# Patient Record
Sex: MALE | Race: WHITE | URBAN - METROPOLITAN AREA
[De-identification: names, ages, dates, MRNs, and addresses within clinical notes are randomized per-mention and may not be internally consistent; named-entity substitution may affect disease eponyms.]

---

## 2021-05-20 ENCOUNTER — LAB OUTSIDE AN ENCOUNTER (OUTPATIENT)
Dept: URBAN - METROPOLITAN AREA TELEHEALTH 2 | Facility: TELEHEALTH | Age: 76
End: 2021-05-20

## 2021-05-20 ENCOUNTER — TELEPHONE ENCOUNTER (OUTPATIENT)
Dept: URBAN - METROPOLITAN AREA CLINIC 92 | Facility: CLINIC | Age: 76
End: 2021-05-20

## 2021-05-20 ENCOUNTER — OFFICE VISIT (OUTPATIENT)
Dept: URBAN - METROPOLITAN AREA TELEHEALTH 2 | Facility: TELEHEALTH | Age: 76
End: 2021-05-20
Payer: MEDICARE

## 2021-05-20 ENCOUNTER — TELEPHONE ENCOUNTER (OUTPATIENT)
Dept: URBAN - METROPOLITAN AREA CLINIC 6 | Facility: CLINIC | Age: 76
End: 2021-05-20

## 2021-05-20 VITALS — BODY MASS INDEX: 25.05 KG/M2 | WEIGHT: 175 LBS | HEIGHT: 70 IN

## 2021-05-20 DIAGNOSIS — C61 PROSTATE CANCER: ICD-10-CM

## 2021-05-20 DIAGNOSIS — Z12.11 COLON CANCER SCREENING: ICD-10-CM

## 2021-05-20 DIAGNOSIS — K57.30 ACQUIRED DIVERTICULOSIS OF COLON: ICD-10-CM

## 2021-05-20 DIAGNOSIS — K21.9 GERD: ICD-10-CM

## 2021-05-20 DIAGNOSIS — K31.89 INTESTINAL METAPLASIA OF GASTRIC MUCOSA: ICD-10-CM

## 2021-05-20 DIAGNOSIS — R19.5 MUCUS IN STOOL: ICD-10-CM

## 2021-05-20 DIAGNOSIS — K29.40 ATROPHIC GASTRITIS WITHOUT HEMORRHAGE: ICD-10-CM

## 2021-05-20 DIAGNOSIS — K92.89 GAS BLOAT SYNDROME: ICD-10-CM

## 2021-05-20 DIAGNOSIS — K57.90 DIVERTICULOSIS: ICD-10-CM

## 2021-05-20 PROCEDURE — 99214 OFFICE O/P EST MOD 30 MIN: CPT | Performed by: INTERNAL MEDICINE

## 2021-05-20 RX ORDER — CLONAZEPAM 0.5 MG/1
TABLET ORAL
Qty: 0 | Refills: 0 | Status: ACTIVE | COMMUNITY
Start: 1900-01-01

## 2021-05-20 RX ORDER — GLUCOSAMINE HCL/CHONDROITIN SU 500-400 MG
CAPSULE ORAL
Qty: 0 | Refills: 0 | Status: ACTIVE | COMMUNITY
Start: 1900-01-01

## 2021-05-20 RX ORDER — LEVOTHYROXINE SODIUM 137 UG/1
TABLET ORAL
Qty: 0 | Refills: 0 | Status: ACTIVE | COMMUNITY
Start: 1900-01-01

## 2021-05-20 RX ORDER — FAMOTIDINE 40 MG/1
1 TABLET AT BEDTIME TABLET, FILM COATED ORAL ONCE A DAY
Status: ACTIVE | COMMUNITY

## 2021-05-20 RX ORDER — AMLODIPINE BESYLATE 2.5 MG
1 TABLET TABLET ORAL ONCE A DAY
Status: ACTIVE | COMMUNITY

## 2021-05-20 NOTE — HPI-OTHER HISTORIES
LOT OF THING HAPPEN,  HAD PROSTATE CANCER, PROSTATE SURGERY, REMOVED NOV 2019.   CANCER INSIDE PROSTATE,  NEEDED RADIATION JUNE 2020 FOR SIX WEEKS  AUG 2020, PSA, CONTINUE RISE. BONE SCAN AND CT ABDOMEN WERE NEG  DID PET SCAN IT WAS NEG IN 28 APRIL 2021  STARTED ON HORMONES, 7 TH MAY.   SINCE THEN DURING DAY LEAKS OUT.  EXPENRIENCE BOWEL, EXTRA MOVEMENT, AWFULL CHAKA,   OFF STOOL SMEAR , DISCOLORATION,  WITH PASSING GAS.   NO DIARRRHEA. BM NORMAL DAILY. NO BLOOD IN STOOL.   60 DAYS AGO, GAS AND FEEL LIKE PASSING GAS, WITH LITTLE MUCUS.   LAST WEEK STOPPED FOR 7 DAYS IN ROW, AGAIN STARTED.  DOES TAKE METAMUCIL 1 TABLESPOON FUL DAILY

## 2021-06-30 ENCOUNTER — OFFICE VISIT (OUTPATIENT)
Dept: URBAN - METROPOLITAN AREA SURGERY CENTER 13 | Facility: SURGERY CENTER | Age: 76
End: 2021-06-30
Payer: MEDICARE

## 2021-06-30 ENCOUNTER — CLAIMS CREATED FROM THE CLAIM WINDOW (OUTPATIENT)
Dept: URBAN - METROPOLITAN AREA CLINIC 4 | Facility: CLINIC | Age: 76
End: 2021-06-30
Payer: MEDICARE

## 2021-06-30 DIAGNOSIS — K29.40 ATROPHIC GASTRITIS: ICD-10-CM

## 2021-06-30 DIAGNOSIS — K31.89 GASTRIC FOVEOLAR HYPERPLASIA: ICD-10-CM

## 2021-06-30 DIAGNOSIS — K21.9 ACID REFLUX: ICD-10-CM

## 2021-06-30 DIAGNOSIS — Z12.11 COLON CANCER SCREENING: ICD-10-CM

## 2021-06-30 DIAGNOSIS — K22.8 COLUMNAR-LINED ESOPHAGUS: ICD-10-CM

## 2021-06-30 DIAGNOSIS — K22.8 OTHER SPECIFIED DISEASES OF ESOPHAGUS: ICD-10-CM

## 2021-06-30 DIAGNOSIS — K29.40 CHRONIC ATROPHIC GASTRITIS WITHOUT BLEEDING: ICD-10-CM

## 2021-06-30 PROCEDURE — 88312 SPECIAL STAINS GROUP 1: CPT | Performed by: PATHOLOGY

## 2021-06-30 PROCEDURE — 88342 IMHCHEM/IMCYTCHM 1ST ANTB: CPT | Performed by: PATHOLOGY

## 2021-06-30 PROCEDURE — 88305 TISSUE EXAM BY PATHOLOGIST: CPT | Performed by: PATHOLOGY

## 2021-06-30 PROCEDURE — 43239 EGD BIOPSY SINGLE/MULTIPLE: CPT | Performed by: INTERNAL MEDICINE

## 2021-06-30 PROCEDURE — G0121 COLON CA SCRN NOT HI RSK IND: HCPCS | Performed by: INTERNAL MEDICINE

## 2021-06-30 PROCEDURE — G8907 PT DOC NO EVENTS ON DISCHARG: HCPCS | Performed by: INTERNAL MEDICINE

## 2021-06-30 PROCEDURE — 88341 IMHCHEM/IMCYTCHM EA ADD ANTB: CPT | Performed by: PATHOLOGY

## 2021-07-22 ENCOUNTER — WEB ENCOUNTER (OUTPATIENT)
Dept: URBAN - METROPOLITAN AREA CLINIC 115 | Facility: CLINIC | Age: 76
End: 2021-07-22

## 2022-02-09 ENCOUNTER — WEB ENCOUNTER (OUTPATIENT)
Dept: URBAN - METROPOLITAN AREA CLINIC 115 | Facility: CLINIC | Age: 77
End: 2022-02-09

## 2022-02-09 ENCOUNTER — OFFICE VISIT (OUTPATIENT)
Dept: URBAN - METROPOLITAN AREA CLINIC 115 | Facility: CLINIC | Age: 77
End: 2022-02-09
Payer: MEDICARE

## 2022-02-09 DIAGNOSIS — R15.9 INCONTINENCE OF FECES, UNSPECIFIED FECAL INCONTINENCE TYPE: ICD-10-CM

## 2022-02-09 DIAGNOSIS — K31.89 INTESTINAL METAPLASIA OF GASTRIC MUCOSA: ICD-10-CM

## 2022-02-09 DIAGNOSIS — K29.40 ATROPHIC GASTRITIS WITHOUT HEMORRHAGE: ICD-10-CM

## 2022-02-09 DIAGNOSIS — K57.90 DIVERTICULOSIS: ICD-10-CM

## 2022-02-09 DIAGNOSIS — C61 PROSTATE CANCER: ICD-10-CM

## 2022-02-09 DIAGNOSIS — R19.5 MUCUS IN STOOL: ICD-10-CM

## 2022-02-09 DIAGNOSIS — K92.89 GAS BLOAT SYNDROME: ICD-10-CM

## 2022-02-09 DIAGNOSIS — K21.9 GERD: ICD-10-CM

## 2022-02-09 PROBLEM — 275978004 COLON CANCER SCREENING: Status: ACTIVE | Noted: 2021-05-20

## 2022-02-09 PROCEDURE — 99213 OFFICE O/P EST LOW 20 MIN: CPT | Performed by: INTERNAL MEDICINE

## 2022-02-09 RX ORDER — FAMOTIDINE 40 MG/1
1 TABLET AT BEDTIME TABLET, FILM COATED ORAL ONCE A DAY
Status: ACTIVE | COMMUNITY

## 2022-02-09 RX ORDER — CHOLECALCIFEROL TAB 50 MCG (2000 UNIT) 50 MCG
ONCE PER WEEK TAB ORAL
Status: ACTIVE | COMMUNITY

## 2022-02-09 RX ORDER — CLONAZEPAM 0.5 MG/1
TABLET ORAL
Qty: 0 | Refills: 0 | Status: ACTIVE | COMMUNITY
Start: 1900-01-01

## 2022-02-09 RX ORDER — AMLODIPINE BESYLATE 2.5 MG
1 TABLET TABLET ORAL ONCE A DAY
Status: ACTIVE | COMMUNITY

## 2022-02-09 RX ORDER — ZINC GLUCONATE 50 MG
1 TABLET TABLET ORAL ONCE A DAY
Status: ACTIVE | COMMUNITY

## 2022-02-09 RX ORDER — GLUCOSAMINE HCL/CHONDROITIN SU 500-400 MG
CAPSULE ORAL
Qty: 0 | Refills: 0 | Status: ACTIVE | COMMUNITY
Start: 1900-01-01

## 2022-02-09 RX ORDER — DEXTROMETHORPHAN POLISTIREX 30 MG/5 ML
AS DIRECTED SUSPENSION, EXTENDED RELEASE 12 HR ORAL
Status: ACTIVE | COMMUNITY

## 2022-02-09 RX ORDER — LEVOTHYROXINE SODIUM 137 UG/1
TABLET ORAL
Qty: 0 | Refills: 0 | Status: ACTIVE | COMMUNITY
Start: 1900-01-01

## 2022-02-09 NOTE — HPI-TODAY'S VISIT:
VIRTUAL VISIT IN MAY.   H/O RADIATION FOR PROSTATE  FEW MONTHS LATER HAD MUCUS/GAS  OCC DISCOLORATION IN STOOL IF MOVE  LAST 5 DAYS, THEN IT STOP, THEN 3-4 WEEKS, NORMAL  INCONTINENCE.   NO CONSTIPATION, NO ANTIBITICS,   SHOULDER SURGERY / DENTAL APPOINTMENT. DID HAVE ANTIBIOTICS.   NO BLEEDING P/R  NO ABDOMINAL PAIN

## 2022-08-31 ENCOUNTER — TELEPHONE ENCOUNTER (OUTPATIENT)
Dept: URBAN - METROPOLITAN AREA CLINIC 115 | Facility: CLINIC | Age: 77
End: 2022-08-31

## 2022-09-06 ENCOUNTER — OFFICE VISIT (OUTPATIENT)
Dept: URBAN - METROPOLITAN AREA CLINIC 82 | Facility: CLINIC | Age: 77
End: 2022-09-06
Payer: MEDICARE

## 2022-09-06 ENCOUNTER — LAB OUTSIDE AN ENCOUNTER (OUTPATIENT)
Dept: URBAN - METROPOLITAN AREA CLINIC 82 | Facility: CLINIC | Age: 77
End: 2022-09-06

## 2022-09-06 VITALS
HEART RATE: 56 BPM | HEIGHT: 70 IN | TEMPERATURE: 98.2 F | BODY MASS INDEX: 25.68 KG/M2 | SYSTOLIC BLOOD PRESSURE: 129 MMHG | WEIGHT: 179.4 LBS | DIASTOLIC BLOOD PRESSURE: 70 MMHG

## 2022-09-06 DIAGNOSIS — K29.40 ATROPHIC GASTRITIS WITHOUT HEMORRHAGE: ICD-10-CM

## 2022-09-06 DIAGNOSIS — K92.89 GAS BLOAT SYNDROME: ICD-10-CM

## 2022-09-06 DIAGNOSIS — K21.9 GERD: ICD-10-CM

## 2022-09-06 DIAGNOSIS — D50.0 IRON DEFICIENCY ANEMIA DUE TO CHRONIC BLOOD LOSS: ICD-10-CM

## 2022-09-06 DIAGNOSIS — C61 PROSTATE CANCER: ICD-10-CM

## 2022-09-06 DIAGNOSIS — R15.9 INCONTINENCE OF FECES, UNSPECIFIED FECAL INCONTINENCE TYPE: ICD-10-CM

## 2022-09-06 DIAGNOSIS — D51.0 PERNICIOUS ANEMIA: ICD-10-CM

## 2022-09-06 DIAGNOSIS — K57.90 DIVERTICULOSIS: ICD-10-CM

## 2022-09-06 DIAGNOSIS — R19.5 MUCUS IN STOOL: ICD-10-CM

## 2022-09-06 DIAGNOSIS — K31.89 INTESTINAL METAPLASIA OF GASTRIC MUCOSA: ICD-10-CM

## 2022-09-06 PROBLEM — 84568007: Status: ACTIVE | Noted: 2021-05-20

## 2022-09-06 PROBLEM — 72519002: Status: ACTIVE | Noted: 2021-05-20

## 2022-09-06 PROBLEM — 72042002: Status: ACTIVE | Noted: 2022-02-09

## 2022-09-06 PROBLEM — 271864008: Status: ACTIVE | Noted: 2021-05-20

## 2022-09-06 PROBLEM — 84027009: Status: ACTIVE | Noted: 2022-09-06

## 2022-09-06 PROBLEM — 398050005 DIVERTICULAR DISEASE OF COLON: Status: ACTIVE | Noted: 2021-05-20

## 2022-09-06 PROBLEM — 399068003: Status: ACTIVE | Noted: 2021-05-20

## 2022-09-06 PROBLEM — 119292006: Status: ACTIVE | Noted: 2021-05-20

## 2022-09-06 PROCEDURE — 99214 OFFICE O/P EST MOD 30 MIN: CPT | Performed by: INTERNAL MEDICINE

## 2022-09-06 RX ORDER — CHOLECALCIFEROL TAB 50 MCG (2000 UNIT) 50 MCG
ONCE PER WEEK TAB ORAL
Status: ACTIVE | COMMUNITY

## 2022-09-06 RX ORDER — CLONAZEPAM 0.5 MG/1
TABLET ORAL
Qty: 0 | Refills: 0 | Status: ACTIVE | COMMUNITY
Start: 1900-01-01

## 2022-09-06 RX ORDER — TADALAFIL 20 MG/1
1 TABLET TABLET, COATED ORAL
Status: ACTIVE | COMMUNITY

## 2022-09-06 RX ORDER — ZINC GLUCONATE 50 MG
1 TABLET TABLET ORAL ONCE A DAY
Status: ACTIVE | COMMUNITY

## 2022-09-06 RX ORDER — GLUCOSAMINE HCL/CHONDROITIN SU 500-400 MG
CAPSULE ORAL
Qty: 0 | Refills: 0 | Status: ACTIVE | COMMUNITY
Start: 1900-01-01

## 2022-09-06 RX ORDER — LEVOTHYROXINE SODIUM 137 UG/1
1 TABLET IN THE MORNING ON AN EMPTY STOMACH TABLET ORAL ONCE A DAY
Status: ACTIVE | COMMUNITY

## 2022-09-06 RX ORDER — AMLODIPINE BESYLATE 2.5 MG
1 TABLET TABLET ORAL ONCE A DAY
Status: ACTIVE | COMMUNITY

## 2022-09-06 RX ORDER — DEXTROMETHORPHAN POLISTIREX 30 MG/5 ML
AS DIRECTED SUSPENSION, EXTENDED RELEASE 12 HR ORAL
Status: ACTIVE | COMMUNITY

## 2022-09-06 RX ORDER — LEVOTHYROXINE SODIUM 137 UG/1
TABLET ORAL
Qty: 0 | Refills: 0 | Status: DISCONTINUED | COMMUNITY
Start: 1900-01-01

## 2022-09-06 RX ORDER — FAMOTIDINE 40 MG/1
1 TABLET AT BEDTIME TABLET, FILM COATED ORAL ONCE A DAY
Status: ACTIVE | COMMUNITY

## 2022-09-06 NOTE — HPI-OTHER HISTORIES
LOW FERRITIN, SLOW DRIFT IN HH IN LAST 3 YEARS. GOT IRON INFUSION  PCP, LABS SHOWED LOW IRON LEVEL, PHYSICAL JUNE 30,   IRON SUPPLMENT, ONCE A DAY. RECHECKED AUG 18, IRON WERE EVEN LOWER  2020 RADIATION, 2021 HORMONE SHOTS FOR PROSTATE CANCER  SEEN  MEDICAL ONCOLOGY, [facility], HE GAVE IRON INFUSION, HEMOGLOBIN BEFORE INFUSION WAS 12.4 LOWEST  NO BLEEDING P/R, NO BLACK STOOL, ONLY WITH IRON TABLET, ONCE A DAY, LAST THURSDAY WITH INFUSION TOOK OFF ORAL IRON, BM NORMAL,  LITTLE BIT BLOATED, MUCUS STILL OFF AND ON, URINATE OR STRAIN STOOL WILL COME OUT  CHANGED TO CITRUCEL, NO MORE MUCUS.  OCC NSAIDS,   TAKES APSIRIN REGULARLY,  HE IS GOING TO TALK TO HIS CARDIOLOGIST.   STILL TAKES FAMOTIDINE,  TRIED QOD.

## 2022-09-15 ENCOUNTER — OFFICE VISIT (OUTPATIENT)
Dept: URBAN - METROPOLITAN AREA CLINIC 114 | Facility: CLINIC | Age: 77
End: 2022-09-15
Payer: MEDICARE

## 2022-09-15 DIAGNOSIS — D50.0 IRON DEFICIENCY ANEMIA DUE TO CHRONIC BLOOD LOSS: ICD-10-CM

## 2022-09-15 PROCEDURE — G0328 FECAL BLOOD SCRN IMMUNOASSAY: HCPCS | Performed by: INTERNAL MEDICINE

## 2022-09-15 RX ORDER — GLUCOSAMINE HCL/CHONDROITIN SU 500-400 MG
CAPSULE ORAL
Qty: 0 | Refills: 0 | Status: ACTIVE | COMMUNITY
Start: 1900-01-01

## 2022-09-15 RX ORDER — DEXTROMETHORPHAN POLISTIREX 30 MG/5 ML
AS DIRECTED SUSPENSION, EXTENDED RELEASE 12 HR ORAL
Status: ACTIVE | COMMUNITY

## 2022-09-15 RX ORDER — LEVOTHYROXINE SODIUM 137 UG/1
1 TABLET IN THE MORNING ON AN EMPTY STOMACH TABLET ORAL ONCE A DAY
Status: ACTIVE | COMMUNITY

## 2022-09-15 RX ORDER — CLONAZEPAM 0.5 MG/1
TABLET ORAL
Qty: 0 | Refills: 0 | Status: ACTIVE | COMMUNITY
Start: 1900-01-01

## 2022-09-15 RX ORDER — CHOLECALCIFEROL TAB 50 MCG (2000 UNIT) 50 MCG
ONCE PER WEEK TAB ORAL
Status: ACTIVE | COMMUNITY

## 2022-09-15 RX ORDER — ZINC GLUCONATE 50 MG
1 TABLET TABLET ORAL ONCE A DAY
Status: ACTIVE | COMMUNITY

## 2022-09-15 RX ORDER — FAMOTIDINE 40 MG/1
1 TABLET AT BEDTIME TABLET, FILM COATED ORAL ONCE A DAY
Status: ACTIVE | COMMUNITY

## 2022-09-15 RX ORDER — TADALAFIL 20 MG/1
1 TABLET TABLET, COATED ORAL
Status: ACTIVE | COMMUNITY

## 2022-09-15 RX ORDER — AMLODIPINE BESYLATE 2.5 MG
1 TABLET TABLET ORAL ONCE A DAY
Status: ACTIVE | COMMUNITY

## 2022-09-21 ENCOUNTER — TELEPHONE ENCOUNTER (OUTPATIENT)
Dept: URBAN - METROPOLITAN AREA CLINIC 115 | Facility: CLINIC | Age: 77
End: 2022-09-21

## 2022-09-27 ENCOUNTER — OFFICE VISIT (OUTPATIENT)
Dept: URBAN - METROPOLITAN AREA CLINIC 81 | Facility: CLINIC | Age: 77
End: 2022-09-27

## 2022-09-28 ENCOUNTER — WEB ENCOUNTER (OUTPATIENT)
Dept: URBAN - METROPOLITAN AREA CLINIC 81 | Facility: CLINIC | Age: 77
End: 2022-09-28

## 2022-10-04 ENCOUNTER — TELEPHONE ENCOUNTER (OUTPATIENT)
Dept: URBAN - METROPOLITAN AREA CLINIC 115 | Facility: CLINIC | Age: 77
End: 2022-10-04

## 2022-10-04 ENCOUNTER — OFFICE VISIT (OUTPATIENT)
Dept: URBAN - METROPOLITAN AREA CLINIC 81 | Facility: CLINIC | Age: 77
End: 2022-10-04

## 2022-10-04 RX ORDER — DEXTROMETHORPHAN POLISTIREX 30 MG/5 ML
AS DIRECTED SUSPENSION, EXTENDED RELEASE 12 HR ORAL
Status: ACTIVE | COMMUNITY

## 2022-10-04 RX ORDER — AMLODIPINE BESYLATE 2.5 MG
1 TABLET TABLET ORAL ONCE A DAY
Status: ACTIVE | COMMUNITY

## 2022-10-04 RX ORDER — LEVOTHYROXINE SODIUM 137 UG/1
1 TABLET IN THE MORNING ON AN EMPTY STOMACH TABLET ORAL ONCE A DAY
Status: ACTIVE | COMMUNITY

## 2022-10-04 RX ORDER — CHOLECALCIFEROL TAB 50 MCG (2000 UNIT) 50 MCG
ONCE PER WEEK TAB ORAL
Status: ACTIVE | COMMUNITY

## 2022-10-04 RX ORDER — ZINC GLUCONATE 50 MG
1 TABLET TABLET ORAL ONCE A DAY
Status: ACTIVE | COMMUNITY

## 2022-10-04 RX ORDER — GLUCOSAMINE HCL/CHONDROITIN SU 500-400 MG
CAPSULE ORAL
Qty: 0 | Refills: 0 | Status: ACTIVE | COMMUNITY
Start: 1900-01-01

## 2022-10-04 RX ORDER — TADALAFIL 20 MG/1
1 TABLET TABLET, COATED ORAL
Status: ACTIVE | COMMUNITY

## 2022-10-04 RX ORDER — CLONAZEPAM 0.5 MG/1
TABLET ORAL
Qty: 0 | Refills: 0 | Status: ACTIVE | COMMUNITY
Start: 1900-01-01

## 2022-10-04 RX ORDER — FAMOTIDINE 40 MG/1
1 TABLET AT BEDTIME TABLET, FILM COATED ORAL ONCE A DAY
Status: ACTIVE | COMMUNITY

## 2022-10-17 ENCOUNTER — OFFICE VISIT (OUTPATIENT)
Dept: URBAN - METROPOLITAN AREA CLINIC 115 | Facility: CLINIC | Age: 77
End: 2022-10-17

## 2022-11-29 PROBLEM — 724556004: Status: ACTIVE | Noted: 2022-09-06

## 2024-04-22 ENCOUNTER — LAB (OUTPATIENT)
Dept: URBAN - METROPOLITAN AREA CLINIC 115 | Facility: CLINIC | Age: 79
End: 2024-04-22

## 2024-04-22 ENCOUNTER — OV EP (OUTPATIENT)
Dept: URBAN - METROPOLITAN AREA CLINIC 115 | Facility: CLINIC | Age: 79
End: 2024-04-22

## 2024-04-22 VITALS
DIASTOLIC BLOOD PRESSURE: 75 MMHG | SYSTOLIC BLOOD PRESSURE: 138 MMHG | BODY MASS INDEX: 26.03 KG/M2 | WEIGHT: 181.8 LBS | HEART RATE: 55 BPM | TEMPERATURE: 97.8 F | HEIGHT: 70 IN

## 2024-04-22 DIAGNOSIS — K57.30 ACQUIRED DIVERTICULOSIS OF COLON: ICD-10-CM

## 2024-04-22 DIAGNOSIS — R10.84 ABDOMINAL CRAMPING, GENERALIZED: ICD-10-CM

## 2024-04-22 DIAGNOSIS — R15.9 INCONTINENCE OF FECES, UNSPECIFIED FECAL INCONTINENCE TYPE: ICD-10-CM

## 2024-04-22 DIAGNOSIS — D50.8 ACHLORHYDRIC ANEMIA: ICD-10-CM

## 2024-04-22 PROBLEM — 54586004: Status: ACTIVE | Noted: 2024-04-22

## 2024-04-22 RX ORDER — TADALAFIL 20 MG/1
1 TABLET TABLET, COATED ORAL
Status: ACTIVE | COMMUNITY

## 2024-04-22 RX ORDER — DEXTROMETHORPHAN POLISTIREX 30 MG/5 ML
AS DIRECTED SUSPENSION, EXTENDED RELEASE 12 HR ORAL
Status: ACTIVE | COMMUNITY

## 2024-04-22 RX ORDER — FERROUS SULFATE 325(65) MG
1 TABLET TABLET ORAL
Status: ACTIVE | COMMUNITY

## 2024-04-22 RX ORDER — FAMOTIDINE 40 MG/1
1 TABLET AT BEDTIME TABLET, FILM COATED ORAL ONCE A DAY
Status: ACTIVE | COMMUNITY

## 2024-04-22 RX ORDER — CYANOCOBALAMIN 1000 UG/ML
1 ML INJECTION INTRAMUSCULAR; SUBCUTANEOUS
Status: ACTIVE | COMMUNITY

## 2024-04-22 RX ORDER — CLONAZEPAM 0.5 MG/1
TABLET ORAL
Qty: 0 | Refills: 0 | Status: ACTIVE | COMMUNITY
Start: 1900-01-01

## 2024-04-22 RX ORDER — ZINC GLUCONATE 50 MG
1 TABLET TABLET ORAL ONCE A DAY
Status: ACTIVE | COMMUNITY

## 2024-04-22 RX ORDER — CHOLECALCIFEROL TAB 50 MCG (2000 UNIT) 50 MCG
ONCE PER WEEK TAB ORAL
Status: ACTIVE | COMMUNITY

## 2024-04-22 RX ORDER — GLUCOSAMINE HCL/CHONDROITIN SU 500-400 MG
CAPSULE ORAL
Qty: 0 | Refills: 0 | Status: DISCONTINUED | COMMUNITY
Start: 1900-01-01

## 2024-04-22 RX ORDER — LEVOTHYROXINE SODIUM 137 UG/1
1 TABLET IN THE MORNING ON AN EMPTY STOMACH TABLET ORAL ONCE A DAY
Status: ACTIVE | COMMUNITY

## 2024-04-22 RX ORDER — AMLODIPINE BESYLATE 2.5 MG
1 TABLET TABLET ORAL ONCE A DAY
Status: ACTIVE | COMMUNITY

## 2024-04-22 NOTE — HPI-TODAY'S VISIT:
Patient had labs on January 18, 2024 hemoglobin was 16.2 patient was seen by primary doctor he had a CT SVT and DVT they screen him for hypercoagulable state.  Patient has history of sinus bradycardia DVT hypothyroidism.  He is hemoglobin on 4/20/2022 was 11.5  LETTER LAST FALL, OVER DUE FOR FOLLOW UP.  STOMACH PAIN AND LOWER AND LLQ. THEN TO  BACK, PAIN IS SUBSIDED QUIT TAKING MELATONIN, NOW TAKES MAGNEISUM FOR REST AND SLEEP, TAKES ONE A DAY. FOR 2 NIGHT TOOK 200MG AND UPSET STOMACH, TERRIBLE SLEEP LAST NIGHT. BM OKAY,  NO BLEEDING.  S/P PROSTATE REMOVED, AND S/P RADIATION.   MUCUS IN STOOL, WHEN URINATING , STOPPED WHEN TOOK IRON PILL,

## 2024-05-08 ENCOUNTER — LAB OUTSIDE AN ENCOUNTER (OUTPATIENT)
Dept: URBAN - METROPOLITAN AREA CLINIC 82 | Facility: CLINIC | Age: 79
End: 2024-05-08

## 2024-05-08 LAB
CREATININE POC: 1
PERFORMING LAB: (no result)

## 2024-05-15 ENCOUNTER — DASHBOARD ENCOUNTERS (OUTPATIENT)
Age: 79
End: 2024-05-15

## 2024-05-15 ENCOUNTER — LAB OUTSIDE AN ENCOUNTER (OUTPATIENT)
Dept: URBAN - METROPOLITAN AREA CLINIC 115 | Facility: CLINIC | Age: 79
End: 2024-05-15

## 2024-05-15 ENCOUNTER — OFFICE VISIT (OUTPATIENT)
Dept: URBAN - METROPOLITAN AREA CLINIC 115 | Facility: CLINIC | Age: 79
End: 2024-05-15
Payer: MEDICARE

## 2024-05-15 VITALS
DIASTOLIC BLOOD PRESSURE: 75 MMHG | SYSTOLIC BLOOD PRESSURE: 143 MMHG | BODY MASS INDEX: 26.03 KG/M2 | HEIGHT: 70 IN | HEART RATE: 58 BPM | TEMPERATURE: 98.2 F | WEIGHT: 181.8 LBS

## 2024-05-15 DIAGNOSIS — K29.40 ATROPHIC GASTRITIS WITHOUT HEMORRHAGE: ICD-10-CM

## 2024-05-15 DIAGNOSIS — R19.5 MUCUS IN STOOL: ICD-10-CM

## 2024-05-15 DIAGNOSIS — K57.90 DIVERTICULOSIS: ICD-10-CM

## 2024-05-15 DIAGNOSIS — K31.89 INTESTINAL METAPLASIA OF GASTRIC MUCOSA: ICD-10-CM

## 2024-05-15 DIAGNOSIS — D51.0 PERNICIOUS ANEMIA: ICD-10-CM

## 2024-05-15 DIAGNOSIS — D50.0 IRON DEFICIENCY ANEMIA DUE TO CHRONIC BLOOD LOSS: ICD-10-CM

## 2024-05-15 DIAGNOSIS — K21.9 GERD: ICD-10-CM

## 2024-05-15 DIAGNOSIS — K92.89 GAS BLOAT SYNDROME: ICD-10-CM

## 2024-05-15 DIAGNOSIS — R93.5 ABNORMAL CT OF THE ABDOMEN: ICD-10-CM

## 2024-05-15 DIAGNOSIS — C61 PROSTATE CANCER: ICD-10-CM

## 2024-05-15 DIAGNOSIS — R10.30 LOWER ABDOMINAL PAIN: ICD-10-CM

## 2024-05-15 DIAGNOSIS — R15.9 INCONTINENCE OF FECES, UNSPECIFIED FECAL INCONTINENCE TYPE: ICD-10-CM

## 2024-05-15 PROBLEM — 15634181000119107: Status: ACTIVE | Noted: 2024-05-15

## 2024-05-15 PROCEDURE — 99214 OFFICE O/P EST MOD 30 MIN: CPT | Performed by: INTERNAL MEDICINE

## 2024-05-15 RX ORDER — CYANOCOBALAMIN 1000 UG/ML
1 ML INJECTION INTRAMUSCULAR; SUBCUTANEOUS
Status: ACTIVE | COMMUNITY

## 2024-05-15 RX ORDER — FAMOTIDINE 40 MG/1
1 TABLET AT BEDTIME TABLET, FILM COATED ORAL ONCE A DAY
Status: ACTIVE | COMMUNITY

## 2024-05-15 RX ORDER — POLYETHYLENE GLYCOL 3350, SODIUM SULFATE ANHYDROUS, SODIUM BICARBONATE, SODIUM CHLORIDE, POTASSIUM CHLORIDE 227.1; 21.5; 6.36; 5.53; .754 G/L; G/L; G/L; G/L; G/L
236 GM POWDER, FOR SOLUTION ORAL
Qty: 1 GALLON | Refills: 0 | OUTPATIENT
Start: 2024-05-15 | End: 2024-05-16

## 2024-05-15 RX ORDER — DEXTROMETHORPHAN POLISTIREX 30 MG/5 ML
AS DIRECTED SUSPENSION, EXTENDED RELEASE 12 HR ORAL
Status: ACTIVE | COMMUNITY

## 2024-05-15 RX ORDER — CLONAZEPAM 0.5 MG/1
TABLET ORAL
Qty: 0 | Refills: 0 | Status: ACTIVE | COMMUNITY
Start: 1900-01-01

## 2024-05-15 RX ORDER — ZINC GLUCONATE 50 MG
1 TABLET TABLET ORAL ONCE A DAY
Status: ACTIVE | COMMUNITY

## 2024-05-15 RX ORDER — AMLODIPINE BESYLATE 2.5 MG
1 TABLET TABLET ORAL ONCE A DAY
Status: ACTIVE | COMMUNITY

## 2024-05-15 RX ORDER — FERROUS SULFATE 325(65) MG
1 TABLET TABLET ORAL
Status: ACTIVE | COMMUNITY

## 2024-05-15 RX ORDER — LEVOTHYROXINE SODIUM 137 UG/1
1 TABLET IN THE MORNING ON AN EMPTY STOMACH TABLET ORAL ONCE A DAY
Status: ACTIVE | COMMUNITY

## 2024-05-15 RX ORDER — BISACODYL 5 MG
2 TABLET, DELAYED RELEASE (ENTERIC COATED) ORAL ONCE A DAY
Qty: 4 | OUTPATIENT
Start: 2024-05-15 | End: 2024-05-17

## 2024-05-15 RX ORDER — TADALAFIL 20 MG/1
1 TABLET TABLET, COATED ORAL
Status: ACTIVE | COMMUNITY

## 2024-05-15 RX ORDER — CHOLECALCIFEROL TAB 50 MCG (2000 UNIT) 50 MCG
ONCE PER WEEK TAB ORAL
Status: ACTIVE | COMMUNITY

## 2024-05-16 ENCOUNTER — TELEPHONE ENCOUNTER (OUTPATIENT)
Dept: URBAN - METROPOLITAN AREA CLINIC 115 | Facility: CLINIC | Age: 79
End: 2024-05-16

## 2024-06-13 ENCOUNTER — TELEPHONE ENCOUNTER (OUTPATIENT)
Dept: URBAN - METROPOLITAN AREA CLINIC 115 | Facility: CLINIC | Age: 79
End: 2024-06-13

## 2024-06-13 RX ORDER — SODIUM PICOSULFATE, MAGNESIUM OXIDE, AND ANHYDROUS CITRIC ACID 12; 3.5; 1 G/175ML; G/175ML; MG/175ML
175 ML THE FIRST DOSE THE EVENING BEFORE AND SECOND DOSE THE MORNING OF COLONOSCOPY LIQUID ORAL ONCE A DAY
Qty: 350 | OUTPATIENT
Start: 2024-06-13 | End: 2024-06-15

## 2024-06-14 ENCOUNTER — OFFICE VISIT (OUTPATIENT)
Dept: URBAN - METROPOLITAN AREA SURGERY CENTER 13 | Facility: SURGERY CENTER | Age: 79
End: 2024-06-14
Payer: MEDICARE

## 2024-06-14 ENCOUNTER — CLAIMS CREATED FROM THE CLAIM WINDOW (OUTPATIENT)
Dept: URBAN - METROPOLITAN AREA CLINIC 4 | Facility: CLINIC | Age: 79
End: 2024-06-14
Payer: MEDICARE

## 2024-06-14 DIAGNOSIS — K64.4 RESIDUAL HEMORRHOIDAL SKIN TAGS: ICD-10-CM

## 2024-06-14 DIAGNOSIS — K63.5 POLYP OF ASCENDING COLON, UNSPECIFIED TYPE: ICD-10-CM

## 2024-06-14 DIAGNOSIS — K57.30 DIVERTICULOSIS OF LARGE INTESTINE WITHOUT PERFORATION OR ABSCESS WITHOUT BLEEDING: ICD-10-CM

## 2024-06-14 DIAGNOSIS — R10.32 ABDOMINAL PAIN, LEFT LOWER QUADRANT: ICD-10-CM

## 2024-06-14 DIAGNOSIS — D12.2 BENIGN NEOPLASM OF ASCENDING COLON: ICD-10-CM

## 2024-06-14 DIAGNOSIS — K63.89 OTHER SPECIFIED DISEASES OF INTESTINE: ICD-10-CM

## 2024-06-14 DIAGNOSIS — R93.3 ABNORMAL FINDINGS ON DIAGNOSTIC IMAGING OF OTHER PARTS OF DIGESTIVE TRACT: ICD-10-CM

## 2024-06-14 DIAGNOSIS — D12.2 ADENOMA OF ASCENDING COLON: ICD-10-CM

## 2024-06-14 PROCEDURE — 88305 TISSUE EXAM BY PATHOLOGIST: CPT | Performed by: PATHOLOGY

## 2024-06-14 PROCEDURE — 45385 COLONOSCOPY W/LESION REMOVAL: CPT | Performed by: INTERNAL MEDICINE

## 2024-06-14 PROCEDURE — 00811 ANES LWR INTST NDSC NOS: CPT | Performed by: STUDENT IN AN ORGANIZED HEALTH CARE EDUCATION/TRAINING PROGRAM

## 2024-06-14 PROCEDURE — 00811 ANES LWR INTST NDSC NOS: CPT | Performed by: ANESTHESIOLOGIST ASSISTANT

## 2024-06-14 RX ORDER — CYANOCOBALAMIN 1000 UG/ML
1 ML INJECTION INTRAMUSCULAR; SUBCUTANEOUS
Status: ACTIVE | COMMUNITY

## 2024-06-14 RX ORDER — SODIUM PICOSULFATE, MAGNESIUM OXIDE, AND ANHYDROUS CITRIC ACID 12; 3.5; 1 G/175ML; G/175ML; MG/175ML
175 ML THE FIRST DOSE THE EVENING BEFORE AND SECOND DOSE THE MORNING OF COLONOSCOPY LIQUID ORAL ONCE A DAY
Qty: 350 | Status: ACTIVE | COMMUNITY
Start: 2024-06-13 | End: 2024-06-15

## 2024-06-14 RX ORDER — DEXTROMETHORPHAN POLISTIREX 30 MG/5 ML
AS DIRECTED SUSPENSION, EXTENDED RELEASE 12 HR ORAL
Status: ACTIVE | COMMUNITY

## 2024-06-14 RX ORDER — CLONAZEPAM 0.5 MG/1
TABLET ORAL
Qty: 0 | Refills: 0 | Status: ACTIVE | COMMUNITY
Start: 1900-01-01

## 2024-06-14 RX ORDER — CHOLECALCIFEROL TAB 50 MCG (2000 UNIT) 50 MCG
ONCE PER WEEK TAB ORAL
Status: ACTIVE | COMMUNITY

## 2024-06-14 RX ORDER — LEVOTHYROXINE SODIUM 137 UG/1
1 TABLET IN THE MORNING ON AN EMPTY STOMACH TABLET ORAL ONCE A DAY
Status: ACTIVE | COMMUNITY

## 2024-06-14 RX ORDER — TADALAFIL 20 MG/1
1 TABLET TABLET, COATED ORAL
Status: ACTIVE | COMMUNITY

## 2024-06-14 RX ORDER — ZINC GLUCONATE 50 MG
1 TABLET TABLET ORAL ONCE A DAY
Status: ACTIVE | COMMUNITY

## 2024-06-14 RX ORDER — FAMOTIDINE 40 MG/1
1 TABLET AT BEDTIME TABLET, FILM COATED ORAL ONCE A DAY
Status: ACTIVE | COMMUNITY

## 2024-06-14 RX ORDER — AMLODIPINE BESYLATE 2.5 MG
1 TABLET TABLET ORAL ONCE A DAY
Status: ACTIVE | COMMUNITY

## 2024-06-14 RX ORDER — FERROUS SULFATE 325(65) MG
1 TABLET TABLET ORAL
Status: ACTIVE | COMMUNITY

## 2024-10-25 ENCOUNTER — TELEPHONE ENCOUNTER (OUTPATIENT)
Dept: URBAN - METROPOLITAN AREA CLINIC 115 | Facility: CLINIC | Age: 79
End: 2024-10-25

## 2024-11-26 ENCOUNTER — TELEPHONE ENCOUNTER (OUTPATIENT)
Dept: URBAN - METROPOLITAN AREA CLINIC 115 | Facility: CLINIC | Age: 79
End: 2024-11-26

## 2024-12-18 ENCOUNTER — TELEPHONE ENCOUNTER (OUTPATIENT)
Dept: URBAN - METROPOLITAN AREA CLINIC 115 | Facility: CLINIC | Age: 79
End: 2024-12-18

## 2025-01-02 ENCOUNTER — TELEPHONE ENCOUNTER (OUTPATIENT)
Dept: URBAN - METROPOLITAN AREA CLINIC 115 | Facility: CLINIC | Age: 80
End: 2025-01-02

## 2025-01-06 ENCOUNTER — TELEPHONE ENCOUNTER (OUTPATIENT)
Dept: URBAN - METROPOLITAN AREA CLINIC 115 | Facility: CLINIC | Age: 80
End: 2025-01-06

## 2025-01-06 ENCOUNTER — OFFICE VISIT (OUTPATIENT)
Dept: URBAN - METROPOLITAN AREA CLINIC 115 | Facility: CLINIC | Age: 80
End: 2025-01-06
Payer: MEDICARE

## 2025-01-06 VITALS
HEART RATE: 58 BPM | WEIGHT: 178.6 LBS | BODY MASS INDEX: 25.57 KG/M2 | TEMPERATURE: 98.2 F | SYSTOLIC BLOOD PRESSURE: 141 MMHG | DIASTOLIC BLOOD PRESSURE: 79 MMHG | HEIGHT: 70 IN

## 2025-01-06 DIAGNOSIS — K92.89 GAS BLOAT SYNDROME: ICD-10-CM

## 2025-01-06 DIAGNOSIS — K57.90 DIVERTICULOSIS: ICD-10-CM

## 2025-01-06 DIAGNOSIS — K29.40 ATROPHIC GASTRITIS WITHOUT HEMORRHAGE: ICD-10-CM

## 2025-01-06 DIAGNOSIS — D51.0 PERNICIOUS ANEMIA: ICD-10-CM

## 2025-01-06 DIAGNOSIS — R93.5 ABNORMAL CT OF THE ABDOMEN: ICD-10-CM

## 2025-01-06 DIAGNOSIS — C61 PROSTATE CANCER: ICD-10-CM

## 2025-01-06 DIAGNOSIS — K31.89 INTESTINAL METAPLASIA OF GASTRIC MUCOSA: ICD-10-CM

## 2025-01-06 DIAGNOSIS — R43.8 BITTER TASTE: ICD-10-CM

## 2025-01-06 DIAGNOSIS — R15.9 INCONTINENCE OF FECES, UNSPECIFIED FECAL INCONTINENCE TYPE: ICD-10-CM

## 2025-01-06 DIAGNOSIS — R10.30 LOWER ABDOMINAL PAIN: ICD-10-CM

## 2025-01-06 DIAGNOSIS — K21.9 GERD: ICD-10-CM

## 2025-01-06 DIAGNOSIS — D50.0 IRON DEFICIENCY ANEMIA DUE TO CHRONIC BLOOD LOSS: ICD-10-CM

## 2025-01-06 DIAGNOSIS — R19.5 MUCUS IN STOOL: ICD-10-CM

## 2025-01-06 PROBLEM — 76489005: Status: ACTIVE | Noted: 2025-01-06

## 2025-01-06 PROCEDURE — 99213 OFFICE O/P EST LOW 20 MIN: CPT | Performed by: INTERNAL MEDICINE

## 2025-01-06 RX ORDER — FAMOTIDINE 20 MG/1
1 TABLET AT BEDTIME TABLET, FILM COATED ORAL TWICE A DAY
Qty: 180 TABLET | Refills: 3 | Status: ACTIVE | COMMUNITY

## 2025-01-06 RX ORDER — ZINC GLUCONATE 50 MG
1 TABLET TABLET ORAL ONCE A DAY
Status: ACTIVE | COMMUNITY

## 2025-01-06 RX ORDER — TADALAFIL 20 MG/1
1 TABLET TABLET, COATED ORAL
Status: ACTIVE | COMMUNITY

## 2025-01-06 RX ORDER — CYANOCOBALAMIN 1000 UG/ML
1 ML INJECTION INTRAMUSCULAR; SUBCUTANEOUS
Status: ACTIVE | COMMUNITY

## 2025-01-06 RX ORDER — CHOLECALCIFEROL TAB 50 MCG (2000 UNIT) 50 MCG
ONCE PER WEEK TAB ORAL
Status: ACTIVE | COMMUNITY

## 2025-01-06 RX ORDER — FERROUS SULFATE 325(65) MG
1 TABLET TABLET ORAL
Status: ACTIVE | COMMUNITY

## 2025-01-06 RX ORDER — DEXTROMETHORPHAN POLISTIREX 30 MG/5 ML
AS DIRECTED SUSPENSION, EXTENDED RELEASE 12 HR ORAL
Status: ACTIVE | COMMUNITY

## 2025-01-06 RX ORDER — CLONAZEPAM 0.5 MG/1
TABLET ORAL
Qty: 0 | Refills: 0 | Status: ACTIVE | COMMUNITY
Start: 1900-01-01

## 2025-01-06 RX ORDER — AMLODIPINE BESYLATE 2.5 MG
1 TABLET TABLET ORAL ONCE A DAY
Status: ACTIVE | COMMUNITY

## 2025-01-06 RX ORDER — LEVOTHYROXINE SODIUM 137 UG/1
1 TABLET IN THE MORNING ON AN EMPTY STOMACH TABLET ORAL ONCE A DAY
Status: ACTIVE | COMMUNITY

## 2025-01-06 NOTE — HPI-TODAY'S VISIT:
NOT TOO BAD,   SEEN ENT.   AUGUST, DRY COUGH, ALL TIME, CONSTANT. STARTED ON PREDNISONE, NASAL STUFF, MUCICNEX, DOES NOT WORK. SEEN DR ENT. DR ARIAS, DIAGNOSED LPR, ADV ANTIREFLUX MEASEURE, TAKE FAMOTIDINE, FINALLY COUGH STOPPED  OCT LAST 2 WEEKS, BITTER TASTE, SEEN ANOTHER ENT. KARLA BRITO,   ADV TO TAKE FAMOTIDEN 20MG BID,, NOW TWICE HELPS SOME  SOMETIME AT BED TIME END UP WITH BITTERNESS, AND HAD TO TAKE EXTRA FAMOTIDINE SPORADICALLY, HELPED SOME  SATURDAY IN AM, CONGESTION, THICK STUFF, CONSTANT SPIT OR SWALLOW LITTLE BITTER TAST, NONE SINCE

## 2025-01-27 ENCOUNTER — OFFICE VISIT (OUTPATIENT)
Dept: URBAN - METROPOLITAN AREA CLINIC 115 | Facility: CLINIC | Age: 80
End: 2025-01-27